# Patient Record
(demographics unavailable — no encounter records)

---

## 2024-10-11 NOTE — PROCEDURE
[Vulvar Biopsy] : Vulvar Biopsy [Time out performed] : Pre-procedure time out performed.  Patient's name, date of birth and procedure confirmed. [Consent Obtained] : Consent obtained [] : in the Talia-Anal region [Size of Biopsy Taken: ___ (mm)] : [unfilled]Umm [Local Anesthesia] : local anesthesia [____ Lidocaine w/o Epi] : ~VmL lidocaine without epinephrine [Betadine] : Betadine [Sent to Pathology] : placed in buffered formalin and sent for pathology [LEEP] : LEEDILAN [Coagulation] : ball electrode using coagulation [Jody's] : Jody's solution [Tolerated Well] : the patient tolerated the procedure well [No Complications] : there were no complications [de-identified] : Right perineum and perirectal condyloma multiple  [de-identified] : Total lidocaine used 6 cc [de-identified] : Follow-up in 2 weeks instructions given Motrin for pain keep area clean and dry using Vasyl bottle and clean gauze

## 2024-10-25 NOTE — PHYSICAL EXAM
[Chaperone Present] : A chaperone was present in the examining room during all aspects of the physical examination [FreeTextEntry2] : GG

## 2024-10-25 NOTE — HISTORY OF PRESENT ILLNESS
[TextBox_4] : PT HERE FOR FOLLOWING UP ON VULVAR BIOPSY LMP: 2018 [LMPDate] : 2018 [TextBox_6] : 2018 [FreeTextEntry1] : 2018

## 2025-07-21 NOTE — PLAN
[FreeTextEntry1] : Continue vitamin D3 multivitamin weightbearing exercise return to 12-month Pap Smear complete

## 2025-07-21 NOTE — PHYSICAL EXAM
[MA] : MA [Chaperoned Physical Exam] : A chaperone was present in the examining room during all aspects of the physical examination. [Appropriately responsive] : appropriately responsive [Alert] : alert [No Acute Distress] : no acute distress [No Lymphadenopathy] : no lymphadenopathy [Regular Rate Rhythm] : regular rate rhythm [No Murmurs] : no murmurs [Clear to Auscultation B/L] : clear to auscultation bilaterally [Soft] : soft [Non-tender] : non-tender [Non-distended] : non-distended [No HSM] : No HSM [No Mass] : no mass [Oriented x3] : oriented x3 [Examination Of The Breasts] : a normal appearance [No Masses] : no breast masses were palpable [No Lesions] : no lesions  [Labia Majora] : normal [Labia Minora] : normal [Dry Mucosa] : dry mucosa [No Bleeding] : There was no active vaginal bleeding [Normal] : normal [Anteversion] : anteverted [Uterine Adnexae] : normal [Declined] : Patient declined rectal exam [FreeTextEntry2] : JERILYN MOURA [FreeTextEntry6] : Symmetrical [FreeTextEntry8] : No masses nontender

## 2025-07-21 NOTE — HISTORY OF PRESENT ILLNESS
[N] : Patient does not use contraception [Y] : Positive pregnancy history [Currently Active] : currently active [Men] : men [Vaginal] : vaginal [No] : No [TextBox_4] : PT HERE FOR ANNUAL EXAM LMP: 2018 [Mammogramdate] : 7/3/24 [TextBox_19] : BR2 [PapSmeardate] : 7/17/24 [TextBox_31] : WNL [BoneDensityDate] : 7/3/24 [TextBox_37] : OSTEOPENIA [ColonoscopyDate] : 2025 [TextBox_43] : WNL [LMPDate] : 2018 [PGHxTotal] : 2 [[facility] 2 [TextBox_6] : 2018 [FreeTextEntry1] : 2018